# Patient Record
Sex: FEMALE | Race: WHITE | NOT HISPANIC OR LATINO | Employment: UNEMPLOYED | URBAN - NONMETROPOLITAN AREA
[De-identification: names, ages, dates, MRNs, and addresses within clinical notes are randomized per-mention and may not be internally consistent; named-entity substitution may affect disease eponyms.]

---

## 2020-05-08 ENCOUNTER — OFFICE VISIT (OUTPATIENT)
Dept: FAMILY MEDICINE CLINIC | Facility: CLINIC | Age: 20
End: 2020-05-08

## 2020-05-08 VITALS
WEIGHT: 152 LBS | OXYGEN SATURATION: 100 % | BODY MASS INDEX: 25.95 KG/M2 | SYSTOLIC BLOOD PRESSURE: 118 MMHG | HEIGHT: 64 IN | HEART RATE: 74 BPM | DIASTOLIC BLOOD PRESSURE: 68 MMHG | TEMPERATURE: 98.3 F

## 2020-05-08 DIAGNOSIS — K59.00 CONSTIPATION, UNSPECIFIED CONSTIPATION TYPE: Primary | ICD-10-CM

## 2020-05-08 DIAGNOSIS — R10.33 PERIUMBILICAL ABDOMINAL PAIN: ICD-10-CM

## 2020-05-08 PROCEDURE — 99213 OFFICE O/P EST LOW 20 MIN: CPT | Performed by: NURSE PRACTITIONER

## 2020-05-08 RX ORDER — POLYETHYLENE GLYCOL 3350 17 G/17G
17 POWDER, FOR SOLUTION ORAL DAILY
Qty: 10 EACH | Refills: 3 | Status: SHIPPED | OUTPATIENT
Start: 2020-05-08 | End: 2020-07-14

## 2020-05-08 NOTE — PROGRESS NOTES
Subjective   Khadijah Houston is a 19 y.o. female. Abdominal pain.    History of Present Illness   Abdominal Pain  Patient complains of abdominal pain. The pain is described as dull and stabbing, and is 4/10 in intensity. The patient is experiencing periumbilical pain without radiation. Onset was 2 days ago. Symptoms have been unchanged, intermittent . Aggravating factors: none.  Alleviating factors: none. Associated symptoms: diarrhea, harder stools, and abdominal bloating. The patient denies anorexia, arthralagias, belching, chills, constipation, dysuria, fever, flatus, frequency, headache, hematochezia, hematuria, melena, myalgias, nausea, sweats and vomiting. She admits to drinking very little water and says she eats more carbohydrates than fruits or vegetables.      The following portions of the patient's history were reviewed and updated as appropriate: allergies, current medications, past family history, past medical history, past social history, past surgical history and problem list.    Review of Systems   Constitutional: Negative for chills, fatigue and fever.   HENT: Negative for congestion, ear pain, rhinorrhea and sore throat.    Eyes: Negative for blurred vision, double vision and visual disturbance.   Respiratory: Negative for cough, chest tightness, shortness of breath and wheezing.    Cardiovascular: Negative for chest pain, palpitations and leg swelling.   Gastrointestinal: Positive for abdominal distention and abdominal pain. Negative for diarrhea, nausea and vomiting.   Endocrine: Negative for cold intolerance and heat intolerance.   Genitourinary: Negative for difficulty urinating, dysuria, frequency and hematuria.   Musculoskeletal: Negative for arthralgias, back pain, neck pain and neck stiffness.   Skin: Negative for dry skin, pallor, rash, skin lesions and bruise.   Allergic/Immunologic: Negative for environmental allergies, food allergies and immunocompromised state.   Neurological: Negative for  dizziness, syncope, weakness, light-headedness, headache and confusion.   Hematological: Negative for adenopathy. Does not bruise/bleed easily.   Psychiatric/Behavioral: Negative for self-injury, sleep disturbance, suicidal ideas, depressed mood and stress. The patient is not nervous/anxious.        Objective   Physical Exam   Constitutional: She is oriented to person, place, and time. She appears well-developed and well-nourished. She is cooperative. She does not have a sickly appearance. She does not appear ill. No distress.   HENT:   Head: Normocephalic.   Right Ear: Hearing, tympanic membrane, external ear and ear canal normal.   Left Ear: Hearing, tympanic membrane, external ear and ear canal normal.   Nose: Nose normal.   Mouth/Throat: Uvula is midline, oropharynx is clear and moist and mucous membranes are normal.   Eyes: Pupils are equal, round, and reactive to light. Conjunctivae, EOM and lids are normal.   Neck: Trachea normal, normal range of motion, full passive range of motion without pain and phonation normal. Neck supple. No thyroid mass and no thyromegaly present.   Cardiovascular: Normal rate, regular rhythm, S1 normal, S2 normal and normal heart sounds.   No murmur heard.  Pulmonary/Chest: Effort normal and breath sounds normal. No respiratory distress. She has no wheezes. She has no rhonchi. She has no rales.   Abdominal: Soft. Normal appearance and bowel sounds are normal. She exhibits distension. She exhibits no shifting dullness, no pulsatile liver, no fluid wave, no abdominal bruit, no ascites, no pulsatile midline mass and no mass. There is no tenderness. There is no rigidity, no rebound, no guarding, no CVA tenderness, no tenderness at McBurney's point and negative Ramirez's sign.   Neurological: She is alert and oriented to person, place, and time. She has normal strength. No cranial nerve deficit. Coordination and gait normal.   Skin: Skin is warm, dry and intact. She is not diaphoretic. No  pallor.   Psychiatric: She has a normal mood and affect. Her speech is normal and behavior is normal. Judgment and thought content normal. Cognition and memory are normal.     Vitals:    05/08/20 1424   BP: 118/68   Pulse: 74   Temp: 98.3 °F (36.8 °C)   SpO2: 100%         Assessment/Plan   Khadijah was seen today for abdominal pain.    Diagnoses and all orders for this visit:    Constipation, unspecified constipation type  -     magnesium hydroxide (MILK OF MAGNESIA) 400 MG/5ML suspension; Take 30-60 mL by mouth At Night As Needed for Constipation.  -     polyethylene glycol (MIRALAX) 17 g packet; Take 17 g by mouth Daily.    Periumbilical abdominal pain  -     XR Abdomen Flat & Upright (In Office)    Constipation- abdominal xray's show moderate amount of stool and gas, awaiting radiologist to confirm findings. Plan is to have her try MOM 30-60 ml nightly PRN and miralax 1 packet daily. Stressed importance of avoiding sodas and carbohydrates as this can cause/worsen constipation. Instructed pt to work on drinking 8-10 glasses of water daily and to stay active.  If symptoms do not improve or worsen, patient was instructed to return to clinic for further evaluation.         This document has been electronically signed by KISHA Carrillo on  May 8, 2020 15:07

## 2020-07-13 ENCOUNTER — OFFICE VISIT (OUTPATIENT)
Dept: FAMILY MEDICINE CLINIC | Facility: CLINIC | Age: 20
End: 2020-07-13

## 2020-07-13 VITALS
HEIGHT: 64 IN | OXYGEN SATURATION: 98 % | TEMPERATURE: 98.3 F | WEIGHT: 150.4 LBS | BODY MASS INDEX: 25.68 KG/M2 | SYSTOLIC BLOOD PRESSURE: 110 MMHG | HEART RATE: 114 BPM | DIASTOLIC BLOOD PRESSURE: 64 MMHG

## 2020-07-13 DIAGNOSIS — F41.9 ANXIETY: Primary | ICD-10-CM

## 2020-07-13 PROCEDURE — 99213 OFFICE O/P EST LOW 20 MIN: CPT | Performed by: NURSE PRACTITIONER

## 2020-07-13 RX ORDER — HYDROXYZINE PAMOATE 25 MG/1
25 CAPSULE ORAL 3 TIMES DAILY PRN
Qty: 90 CAPSULE | Refills: 0 | Status: SHIPPED | OUTPATIENT
Start: 2020-07-13 | End: 2021-12-08

## 2020-07-13 RX ORDER — SERTRALINE HYDROCHLORIDE 25 MG/1
25 TABLET, FILM COATED ORAL DAILY
Qty: 30 TABLET | Refills: 0 | Status: SHIPPED | OUTPATIENT
Start: 2020-07-13 | End: 2020-08-12 | Stop reason: SDUPTHER

## 2020-07-13 NOTE — PROGRESS NOTES
Subjective   Khadijah Houston is a 20 y.o. female. Anxiety     History of Present Illness   Anxiety  Patient is here for evaluation of anxiety.  She has the following anxiety symptoms: irritable, racing thoughts, shortness of breath. Onset of symptoms was approximately several weeks ago.  Symptoms have been gradually worsening since that time. She denies current suicidal and homicidal ideation. Family history significant for no psychiatric illness.Possible organic causes contributing are: none. Risk factors: none Previous treatment includes none.   She complains of the following medication side effects: none and never taken any medication. Pt reports anxiety is worse when she is working and is currently looking for new job. Reports mood swings and crying for no reason.     The following portions of the patient's history were reviewed and updated as appropriate: allergies, current medications, past family history, past medical history, past social history, past surgical history and problem list.    Review of Systems   Constitutional: Negative for chills, fatigue and fever.   HENT: Negative for congestion, ear pain, rhinorrhea and sore throat.    Eyes: Negative for blurred vision, double vision and visual disturbance.   Respiratory: Negative for cough, chest tightness, shortness of breath and wheezing.    Cardiovascular: Negative for chest pain, palpitations and leg swelling.   Gastrointestinal: Negative for abdominal pain, diarrhea, nausea and vomiting.   Endocrine: Negative for cold intolerance and heat intolerance.   Genitourinary: Negative for difficulty urinating, dysuria, frequency and hematuria.   Musculoskeletal: Negative for arthralgias, back pain, neck pain and neck stiffness.   Skin: Negative for dry skin, pallor, rash, skin lesions and bruise.   Allergic/Immunologic: Negative for environmental allergies, food allergies and immunocompromised state.   Neurological: Negative for dizziness, syncope, weakness,  light-headedness, headache and confusion.   Hematological: Negative for adenopathy. Does not bruise/bleed easily.   Psychiatric/Behavioral: Positive for stress (work related). Negative for self-injury, sleep disturbance, suicidal ideas and depressed mood. The patient is nervous/anxious.        Objective   Physical Exam   Constitutional: She is oriented to person, place, and time. She appears well-developed and well-nourished. She is cooperative. She does not have a sickly appearance. She does not appear ill. No distress.   HENT:   Head: Normocephalic.   Eyes: Pupils are equal, round, and reactive to light. Conjunctivae, EOM and lids are normal.   Neck: Normal range of motion and full passive range of motion without pain. Neck supple.   Cardiovascular: Normal rate, regular rhythm, S1 normal, S2 normal and normal heart sounds.   No murmur heard.  Pulmonary/Chest: Effort normal and breath sounds normal. No respiratory distress. She has no decreased breath sounds. She has no wheezes. She has no rhonchi. She has no rales.   Musculoskeletal: Normal range of motion.   Neurological: She is alert and oriented to person, place, and time. She has normal strength. Coordination and gait normal.   Skin: Skin is warm, dry and intact. She is not diaphoretic. No pallor.   Psychiatric: She has a normal mood and affect. Her speech is normal and behavior is normal. Judgment and thought content normal.     Vitals:    07/13/20 1528   BP: 110/64   Pulse: 114   Temp: 98.3 °F (36.8 °C)   SpO2: 98%         Assessment/Plan   Khadijah was seen today for anxiety.    Diagnoses and all orders for this visit:    Anxiety  -     sertraline (ZOLOFT) 25 MG tablet; Take 1 tablet by mouth Daily.  -     hydrOXYzine pamoate (Vistaril) 25 MG capsule; Take 1 capsule by mouth 3 (Three) Times a Day As Needed for Anxiety.    Anxiety- Pt's symptoms consistent with anxiety. Pt instructed to take zoloft 25 mg daily, she understands it will take 4-6 weeks for it to  be therapeutic. In the meantime she can take vistaril 25 mg TID PRN for anxiety, warned of drowsiness. Pt understands zoloft can cause suicidal thoughts or actions and understands to seek immediate medical attention if she experiences any suicidal thoughts or actions. Pt denies any current suicidal thoughts or actions.   No follow-ups on file.  If symptoms do not improve or worsen, patient was instructed to return to clinic for further evaluation.         This document has been electronically signed by KISHA Carrillo on  July 14, 2020 10:05

## 2020-08-05 ENCOUNTER — OFFICE VISIT (OUTPATIENT)
Dept: FAMILY MEDICINE CLINIC | Facility: CLINIC | Age: 20
End: 2020-08-05

## 2020-08-05 VITALS
WEIGHT: 149 LBS | DIASTOLIC BLOOD PRESSURE: 70 MMHG | SYSTOLIC BLOOD PRESSURE: 122 MMHG | HEART RATE: 68 BPM | BODY MASS INDEX: 25.44 KG/M2 | TEMPERATURE: 99.4 F | OXYGEN SATURATION: 100 % | HEIGHT: 64 IN

## 2020-08-05 DIAGNOSIS — M25.511 ACUTE PAIN OF RIGHT SHOULDER: Primary | ICD-10-CM

## 2020-08-05 DIAGNOSIS — M62.838 MUSCLE SPASMS OF NECK: ICD-10-CM

## 2020-08-05 PROCEDURE — 96372 THER/PROPH/DIAG INJ SC/IM: CPT | Performed by: NURSE PRACTITIONER

## 2020-08-05 PROCEDURE — 99213 OFFICE O/P EST LOW 20 MIN: CPT | Performed by: NURSE PRACTITIONER

## 2020-08-05 RX ORDER — TRIAMCINOLONE ACETONIDE 40 MG/ML
80 INJECTION, SUSPENSION INTRA-ARTICULAR; INTRAMUSCULAR ONCE
Status: COMPLETED | OUTPATIENT
Start: 2020-08-05 | End: 2020-08-05

## 2020-08-05 RX ORDER — TIZANIDINE HYDROCHLORIDE 4 MG/1
4 CAPSULE, GELATIN COATED ORAL 3 TIMES DAILY PRN
Qty: 60 CAPSULE | Refills: 0 | Status: SHIPPED | OUTPATIENT
Start: 2020-08-05 | End: 2021-12-08

## 2020-08-05 RX ADMIN — TRIAMCINOLONE ACETONIDE 80 MG: 40 INJECTION, SUSPENSION INTRA-ARTICULAR; INTRAMUSCULAR at 15:42

## 2020-08-05 NOTE — PROGRESS NOTES
Subjective   Khadijah Houston is a 20 y.o. female. Shoulder pain    History of Present Illness   Shoulder Pain  Patient complains of right shoulder pain. The symptoms began 1 month ago. Aggravating factors: repetitive activity, reaching above head stocking shelves. Pain is located between the neck and shoulder. Discomfort is described as aching. Symptoms are exacerbated by repetitive movements and overhead movements. Evaluation to date: chiropractor. Therapy to date includes: rest, OTC analgesics which are somewhat effective and massage therapy.    The following portions of the patient's history were reviewed and updated as appropriate: allergies, current medications, past family history, past medical history, past social history, past surgical history and problem list.    Review of Systems   Constitutional: Negative for chills, fatigue and fever.   HENT: Negative for congestion, ear pain, rhinorrhea and sore throat.    Eyes: Negative for blurred vision, double vision and visual disturbance.   Respiratory: Negative for cough, chest tightness, shortness of breath and wheezing.    Cardiovascular: Negative for chest pain, palpitations and leg swelling.   Gastrointestinal: Negative for abdominal pain, diarrhea, nausea and vomiting.   Endocrine: Negative for cold intolerance and heat intolerance.   Genitourinary: Negative for difficulty urinating, dysuria, frequency and hematuria.   Musculoskeletal: Positive for arthralgias (right shoulder) and neck pain (right side). Negative for back pain and neck stiffness.   Skin: Negative for dry skin, pallor, rash, skin lesions and bruise.   Allergic/Immunologic: Negative for environmental allergies, food allergies and immunocompromised state.   Neurological: Negative for dizziness, syncope, weakness, light-headedness, headache and confusion.   Hematological: Negative for adenopathy. Does not bruise/bleed easily.   Psychiatric/Behavioral: Negative for self-injury, sleep disturbance,  suicidal ideas, depressed mood and stress. The patient is not nervous/anxious.        Objective   Physical Exam   Constitutional: She is oriented to person, place, and time. She appears well-developed and well-nourished. She is cooperative. She does not have a sickly appearance. She does not appear ill. No distress.   HENT:   Head: Normocephalic.   Eyes: Pupils are equal, round, and reactive to light. Conjunctivae, EOM and lids are normal.   Neck: Normal range of motion and full passive range of motion without pain. Neck supple.   Musculoskeletal: Normal range of motion.        Right shoulder: She exhibits pain. She exhibits normal range of motion, no tenderness, no bony tenderness, no swelling, no effusion, no crepitus, no deformity, no laceration, no spasm, normal pulse and normal strength.        Cervical back: She exhibits pain and spasm. She exhibits normal range of motion, no tenderness, no bony tenderness, no swelling, no edema, no deformity, no laceration and normal pulse.   Neurological: She is alert and oriented to person, place, and time. She has normal strength. Coordination and gait normal.   Skin: Skin is warm, dry and intact. She is not diaphoretic. No pallor.   Psychiatric: She has a normal mood and affect. Her speech is normal and behavior is normal. Judgment and thought content normal.     Vitals:    08/05/20 1504   BP: 122/70   Pulse: 68   Temp: 99.4 °F (37.4 °C)   SpO2: 100%         Assessment/Plan   Khadijah was seen today for shoulder pain.    Diagnoses and all orders for this visit:    Acute pain of right shoulder  -     XR Shoulder 2+ View Right (In Office)  -     triamcinolone acetonide (KENALOG-40) injection 80 mg    Muscle spasms of neck  -     TiZANidine (ZANAFLEX) 4 MG capsule; Take 1 capsule by mouth 3 (Three) Times a Day As Needed for Muscle Spasms.    Acute pain of right shoulder- Shoulder I believe is inflamed due to repetitive motions she does at work. Kenalog 80 mg given IM to reduce  inflammation/pain. Stressed importance of rest, stretching before activity, ice, and can take ibuprofen 600 mg every 8 hours PRN for pain.  Muscle spasms of neck- Pt is straining neck to compensate for shoulder pain. Recommended stretching, heat, kenalog should help neck also, pt instructed to take zanaflex 4 mg TID PRN for muscle spasms (warned of drowsiness).   If symptoms do not improve or worsen, patient was instructed to return to clinic for further evaluation.         This document has been electronically signed by KISHA Carrillo on  August 6, 2020 08:06

## 2020-08-11 ENCOUNTER — TELEPHONE (OUTPATIENT)
Dept: FAMILY MEDICINE CLINIC | Facility: CLINIC | Age: 20
End: 2020-08-11

## 2020-08-12 DIAGNOSIS — F41.9 ANXIETY: ICD-10-CM

## 2020-08-12 RX ORDER — SERTRALINE HYDROCHLORIDE 25 MG/1
25 TABLET, FILM COATED ORAL DAILY
Qty: 90 TABLET | Refills: 3 | Status: SHIPPED | OUTPATIENT
Start: 2020-08-12 | End: 2021-12-08

## 2021-12-08 ENCOUNTER — OFFICE VISIT (OUTPATIENT)
Dept: FAMILY MEDICINE CLINIC | Facility: CLINIC | Age: 21
End: 2021-12-08

## 2021-12-08 VITALS
BODY MASS INDEX: 23.08 KG/M2 | DIASTOLIC BLOOD PRESSURE: 70 MMHG | TEMPERATURE: 97.8 F | SYSTOLIC BLOOD PRESSURE: 110 MMHG | HEART RATE: 85 BPM | WEIGHT: 135.2 LBS | HEIGHT: 64 IN | OXYGEN SATURATION: 98 %

## 2021-12-08 DIAGNOSIS — M54.2 NECK PAIN: ICD-10-CM

## 2021-12-08 DIAGNOSIS — M25.511 ACUTE PAIN OF RIGHT SHOULDER: Primary | ICD-10-CM

## 2021-12-08 PROCEDURE — 99213 OFFICE O/P EST LOW 20 MIN: CPT | Performed by: NURSE PRACTITIONER

## 2021-12-08 PROCEDURE — 96372 THER/PROPH/DIAG INJ SC/IM: CPT | Performed by: NURSE PRACTITIONER

## 2021-12-08 RX ORDER — TRIAMCINOLONE ACETONIDE 40 MG/ML
80 INJECTION, SUSPENSION INTRA-ARTICULAR; INTRAMUSCULAR ONCE
Status: COMPLETED | OUTPATIENT
Start: 2021-12-08 | End: 2021-12-08

## 2021-12-08 RX ADMIN — TRIAMCINOLONE ACETONIDE 80 MG: 40 INJECTION, SUSPENSION INTRA-ARTICULAR; INTRAMUSCULAR at 14:13

## 2021-12-08 NOTE — PROGRESS NOTES
Injection  Injection performed in  by Asha Houston MA. Patient tolerated the procedure well without complications.  12/08/21   Asha Houston MA

## 2021-12-08 NOTE — PROGRESS NOTES
Subjective   Khadijah Houston is a 21 y.o. female. Shoulder pain (right)    History of Present Illness   Patient presents today for shoulder pain. She says shoulder pain started around 1.5 weeks ago after she went bowling. She is also experiencing pain in her neck. Pt reports similar pain in the past and says a steroid shot helped. Pt denies any loss of ROM or strength. At home she has been taking muscle relaxer and advil. Advil provides some pain relief.    The following portions of the patient's history were reviewed and updated as appropriate: allergies, current medications, past family history, past medical history, past social history, past surgical history, and problem list.    Review of Systems   Constitutional: Negative for chills, fatigue and fever.   HENT: Negative for congestion, ear pain, rhinorrhea and sore throat.    Eyes: Negative for blurred vision, double vision and visual disturbance.   Respiratory: Negative for cough, chest tightness, shortness of breath and wheezing.    Cardiovascular: Negative for chest pain, palpitations and leg swelling.   Gastrointestinal: Negative for abdominal pain, diarrhea, nausea and vomiting.   Endocrine: Negative for cold intolerance and heat intolerance.   Genitourinary: Negative for difficulty urinating, dysuria, frequency and hematuria.   Musculoskeletal: Positive for arthralgias (shoulder pain). Negative for back pain, neck pain and neck stiffness.   Skin: Negative for dry skin, pallor, rash, skin lesions and bruise.   Allergic/Immunologic: Negative for environmental allergies, food allergies and immunocompromised state.   Neurological: Negative for dizziness, syncope, weakness, light-headedness, headache and confusion.   Hematological: Negative for adenopathy. Does not bruise/bleed easily.   Psychiatric/Behavioral: Negative for self-injury, sleep disturbance, suicidal ideas, depressed mood and stress. The patient is not nervous/anxious.        Vitals:    12/08/21 1329    BP: 110/70   Pulse: 85   Temp: 97.8 °F (36.6 °C)   SpO2: 98%     Body mass index is 23.21 kg/m².    Objective   Physical Exam  Vitals and nursing note reviewed.   Constitutional:       Appearance: She is well-developed.   HENT:      Head: Normocephalic.   Eyes:      Conjunctiva/sclera: Conjunctivae normal.   Musculoskeletal:         General: Normal range of motion.      Right shoulder: Normal.      Cervical back: Normal, full passive range of motion without pain, normal range of motion and neck supple.   Skin:     General: Skin is warm and dry.   Neurological:      Mental Status: She is alert and oriented to person, place, and time.      Coordination: Coordination normal.      Gait: Gait normal.   Psychiatric:         Speech: Speech normal.         Behavior: Behavior normal. Behavior is cooperative.         Thought Content: Thought content normal.         Judgment: Judgment normal.           Assessment/Plan   Diagnoses and all orders for this visit:    1. Acute pain of right shoulder (Primary)    2. Neck pain  -     triamcinolone acetonide (KENALOG-40) injection 80 mg    Symptoms consistent with muscle strain of neck. Offered xray's, pt would like to treat and if symptoms do not improve we will do xray's.   Kenalog 80 mg given IM in office to reduce inflammation which should help with pain relief.  Recommended rest, stretching, heat, advil otc as directed PRN for pain.  If symptoms do not improve or worsen, patient was instructed to return to clinic for further evaluation.         This document has been electronically signed by KISHA Carrillo on  December 8, 2021 14:30 CST

## 2022-08-04 ENCOUNTER — TELEPHONE (OUTPATIENT)
Dept: INTERNAL MEDICINE | Facility: CLINIC | Age: 22
End: 2022-08-04

## 2022-08-04 NOTE — TELEPHONE ENCOUNTER
Patient called clinic and stated she thinks she had a heart attack 2 nights ago. Pt states she is still having chest pain and is having some shortness of breath issues. Patient states that she is still feeling very weak. I directed pt to the ER to be evaluated. Patient is going to call us back to make an apt after ER visit and will keep us updated.

## 2022-08-05 ENCOUNTER — TELEPHONE (OUTPATIENT)
Dept: INTERNAL MEDICINE | Facility: CLINIC | Age: 22
End: 2022-08-05

## 2022-08-10 ENCOUNTER — OFFICE VISIT (OUTPATIENT)
Dept: CARDIOLOGY | Facility: CLINIC | Age: 22
End: 2022-08-10

## 2022-08-10 VITALS
HEART RATE: 72 BPM | BODY MASS INDEX: 22.36 KG/M2 | SYSTOLIC BLOOD PRESSURE: 110 MMHG | WEIGHT: 131 LBS | OXYGEN SATURATION: 97 % | DIASTOLIC BLOOD PRESSURE: 80 MMHG | HEIGHT: 64 IN

## 2022-08-10 DIAGNOSIS — R06.09 DOE (DYSPNEA ON EXERTION): ICD-10-CM

## 2022-08-10 DIAGNOSIS — R77.8 ELEVATED TROPONIN: ICD-10-CM

## 2022-08-10 DIAGNOSIS — I30.9 ACUTE PERICARDITIS, UNSPECIFIED TYPE: ICD-10-CM

## 2022-08-10 DIAGNOSIS — R07.9 CHEST PAIN IN ADULT: Primary | ICD-10-CM

## 2022-08-10 PROBLEM — R79.89 ELEVATED TROPONIN: Status: ACTIVE | Noted: 2022-08-10

## 2022-08-10 PROCEDURE — 99204 OFFICE O/P NEW MOD 45 MIN: CPT | Performed by: INTERNAL MEDICINE

## 2022-08-10 RX ORDER — COLCHICINE 0.6 MG/1
0.6 TABLET ORAL DAILY
Qty: 28 TABLET | Refills: 3 | Status: SHIPPED | OUTPATIENT
Start: 2022-08-10 | End: 2023-02-21

## 2022-08-10 NOTE — PROGRESS NOTES
Khadijah Houston  [id number]  2000  22 y.o.  female    Referring Provider: Provider, No Known    Reason for  Visit:  Initial visit       Subjective     Chest pain both with exertion as well as at rest.  Feels episodes of chest pain occur more often with exertion  Sharp substernal,   Pressure like at times     Chest pain non pleuritic  Lying backwards chest pain worsens   Chest pain non positional and non gustatory   Lasted for several hours  Radiated to her jaw     Started 1 week ago  Occurs once or twice a week on the average but can be variable in frequency  No associated diaphoresis    No associated nausea  No radiation    Relieved with rest or spontaneously  Not positional    No change with intake of food or antacids  No change with breathing  Mild to moderate associated dyspnea    No similar chest pain episodes in the past     CT angiogram coronary arteries heart with contrast    Anatomical Region Laterality Modality   Chest -- Computed Tomography       Impression    1.  Normal appearance and course of the coronary arteries, negative for plaque or stenosis. CAD RADS 0. Negative for coronary anomaly, pseudoaneurysm or dissection.   2.  Normal appearance of the thoracic aorta. Lungs clear negative for pneumonia.   3.  See body of report for additional details.     I, Azam Quiroz MD, have reviewed the images and verify the above interpretation on 8/5/2022 12:42 PM.     Electronically signed by  Azam Quiroz MD on 8/5/2022 12:42 PM               [place]  Outside Information         Contains abnormal data CRP  Specimen:  Blood  Component   Ref Range & Units 5 d ago   C-Reactive Protein   0.0 - 5.0 mg/L 10.1 High     Resulting Agency [facility]   Specimen Collected: 08/05/22 10:51 AM Last Resulted: 08/05/22 12:22 PM   Received From: [place]  Result Received: 08/10/22 11:31 AM              History of present illness:  Khadijah Houston is a 22 y.o. yo  "female with no major medical issues  who presents today for   Chief Complaint   Patient presents with   • Establish Care     Recent ER f/u - Likely    .    History  History reviewed. No pertinent past medical history.,   History reviewed. No pertinent surgical history.,   Family History   Problem Relation Age of Onset   • No Known Problems Mother    • No Known Problems Father    • Heart disease Maternal Grandfather    • Heart disease Paternal Grandmother    ,   Social History     Tobacco Use   • Smoking status: Never Smoker   • Smokeless tobacco: Never Used   Substance Use Topics   • Alcohol use: Not Currently   • Drug use: Yes     Types: Marijuana   ,     Medications  Current Outpatient Medications   Medication Sig Dispense Refill   • colchicine 0.6 MG tablet Take 1 tablet by mouth Daily. 28 tablet 3     No current facility-administered medications for this visit.       Allergies:  Patient has no known allergies.    Review of Systems  Review of Systems   Constitutional: Negative.   HENT: Negative.    Eyes: Negative.    Cardiovascular: Positive for chest pain and dyspnea on exertion. Negative for claudication, cyanosis, irregular heartbeat, leg swelling, near-syncope, orthopnea, palpitations, paroxysmal nocturnal dyspnea and syncope.   Respiratory: Negative.    Endocrine: Negative.    Hematologic/Lymphatic: Negative.    Skin: Negative.    Gastrointestinal: Negative for anorexia.   Genitourinary: Negative.    Neurological: Negative.    Psychiatric/Behavioral: Negative.        Objective     Physical Exam:  /80   Pulse 72   Ht 162.6 cm (64\")   Wt 59.4 kg (131 lb)   SpO2 97%   BMI 22.49 kg/m²     Physical Exam  Constitutional:       Appearance: Normal appearance.   HENT:      Head: Normocephalic.   Eyes:      General: Lids are normal.   Neck:      Vascular: No carotid bruit.   Cardiovascular:      Rate and Rhythm: Regular rhythm.      Heart sounds: Normal heart sounds, S1 normal and S2 normal.    No " systolic murmur is present.  Pulmonary:      Effort: Pulmonary effort is normal.   Abdominal:      Palpations: Abdomen is soft.   Neurological:      Mental Status: She is alert.   Psychiatric:         Speech: Speech normal.         Behavior: Behavior normal.         Thought Content: Thought content normal.         Results Review:     ____________________________________________________________________________________________________________________________________________  Health maintenance and recommendations    Low salt/ HTN/ Heart healthy carbohydrate restricted cardiac diet   The patient is advised to reduce or avoid caffeine or other cardiac stimulants.   Minimize or avoid  NSAID-type medications      Monitor for any signs of bleeding including red or dark stools. Fall precautions.   Advised staying uptodate with immunizations per established standard guidelines.    Offered to give patient  a copy of my notes     Questions were encouraged, asked and answered to the patient's  understanding and satisfaction. Questions if any regarding current medications and side effects, need for refills and importance of compliance to medications stressed.    Reviewed available prior notes, consults, prior visits, laboratory findings, radiology and cardiology relevant reports. Updated chart as applicable. I have reviewed the patient's medical history in detail and updated the computerized patient record as relevant.      Updated patient regarding any new or relevant abnormalities on review of records or any new findings on physical exam. Mentioned to patient about purpose of visit and desirable health short and long term goals and objectives.    Primary to monitor CBC CMP Lipid panel and TSH as applicable    ___________________________________________________________________________________________________________________________________________   Procedures    Assessment & Plan   Diagnoses and all orders for this  visit:    1. Chest pain in adult (Primary)    2. PERALTA (dyspnea on exertion)    3. Acute pericarditis, unspecified type    4. Elevated troponin    Other orders  -     colchicine 0.6 MG tablet; Take 1 tablet by mouth Daily.  Dispense: 28 tablet; Refill: 3          Plan    Patient expressed understanding  Encouraged and answered all questions   Discussed with the patient and all questioned fully answered. She will call me if any problems arise.   Discussed results of prior testing with patient : echo and cardiac CTA     Motrin 400 mg TID after meals   Requested Prescriptions     Signed Prescriptions Disp Refills   • colchicine 0.6 MG tablet 28 tablet 3     Sig: Take 1 tablet by mouth Daily.      Check BP and heart rates twice daily initially till blood pressures and heart rates under good control and then at least 3x / week,   If blood pressures continue to be well-controlled then can check week a month  at home and bring a recording for review next visit  If BP >130/85 or < 100/60 persistently over 3 reading 30 mins apart or if heart rates persistently above 100 bpm or less than 55 bpm call sooner for evaluation and advise     Follow up with Minerva BEARD , Connor BEARD or Peg Vaughn PA-C             Return in about 2 weeks (around 8/24/2022).

## 2022-09-02 ENCOUNTER — OFFICE VISIT (OUTPATIENT)
Dept: CARDIOLOGY | Facility: CLINIC | Age: 22
End: 2022-09-02

## 2022-09-02 VITALS
BODY MASS INDEX: 22.2 KG/M2 | SYSTOLIC BLOOD PRESSURE: 116 MMHG | OXYGEN SATURATION: 98 % | WEIGHT: 130 LBS | DIASTOLIC BLOOD PRESSURE: 76 MMHG | HEIGHT: 64 IN | HEART RATE: 63 BPM

## 2022-09-02 DIAGNOSIS — R06.09 DOE (DYSPNEA ON EXERTION): ICD-10-CM

## 2022-09-02 DIAGNOSIS — I30.9 ACUTE PERICARDITIS, UNSPECIFIED TYPE: ICD-10-CM

## 2022-09-02 DIAGNOSIS — R77.8 ELEVATED TROPONIN: Primary | ICD-10-CM

## 2022-09-02 DIAGNOSIS — R07.9 CHEST PAIN IN ADULT: ICD-10-CM

## 2022-09-02 PROCEDURE — 93000 ELECTROCARDIOGRAM COMPLETE: CPT | Performed by: INTERNAL MEDICINE

## 2022-09-02 PROCEDURE — 99214 OFFICE O/P EST MOD 30 MIN: CPT | Performed by: INTERNAL MEDICINE

## 2022-09-02 NOTE — PROGRESS NOTES
Khadijah Houston  [id number]  2000  22 y.o.  female    Referring Provider: Provider, No Known    Reason for  Visit:      Here for routine follow up   Initial visit  Was for chest pain     Subjective       Overall much better   No new events or complaints since last visit     Colchicine almost resolved her chest pain   If she misses a dose the chest pain return  Minimal diarrhea   Chest pain both with exertion as well as at rest.  Feels episodes of chest pain occur more often with exertion  Sharp substernal,   Pressure like at times     Chest pain non pleuritic  Lying backwards chest pain worsens   Chest pain non positional and non gustatory   Lasted for several hours  Radiated to her jaw     Started 1 week ago  Occurs once or twice a week on the average but can be variable in frequency  No associated diaphoresis    No associated nausea  No radiation    Relieved with rest or spontaneously  Not positional    No change with intake of food or antacids  No change with breathing  Mild to moderate associated dyspnea    No similar chest pain episodes in the past     CT angiogram coronary arteries heart with contrast    Anatomical Region Laterality Modality   Chest -- Computed Tomography       Impression    1.  Normal appearance and course of the coronary arteries, negative for plaque or stenosis. CAD RADS 0. Negative for coronary anomaly, pseudoaneurysm or dissection.   2.  Normal appearance of the thoracic aorta. Lungs clear negative for pneumonia.   3.  See body of report for additional details.     I, Azam Quiroz MD, have reviewed the images and verify the above interpretation on 8/5/2022 12:42 PM.     Electronically signed by  Azam Quiroz MD on 8/5/2022 12:42 PM               [facility]  Outside Information         Contains abnormal data CRP  Specimen:  Blood  Component   Ref Range & Units 5 d ago   C-Reactive Protein   0.0 - 5.0 mg/L 10.1 High     Resulting Agency [facility]  "  Specimen Collected: 08/05/22 10:51 AM Last Resulted: 08/05/22 12:22 PM   Received From: [facility]  Result Received: 08/10/22 11:31 AM              History of present illness:  Khadijah Houston is a 22 y.o. yo female with no major medical issues  who presents today for   Chief Complaint   Patient presents with   • Chest Pain     2 wk fu for chest pain   .    History  History reviewed. No pertinent past medical history.,   History reviewed. No pertinent surgical history.,   Family History   Problem Relation Age of Onset   • No Known Problems Mother    • No Known Problems Father    • Heart disease Maternal Grandfather    • Heart disease Paternal Grandmother    ,   Social History     Tobacco Use   • Smoking status: Never Smoker   • Smokeless tobacco: Never Used   Vaping Use   • Vaping Use: Never used   Substance Use Topics   • Alcohol use: Not Currently   • Drug use: Yes     Types: Marijuana   ,     Medications  Current Outpatient Medications   Medication Sig Dispense Refill   • colchicine 0.6 MG tablet Take 1 tablet by mouth Daily. 28 tablet 3     No current facility-administered medications for this visit.       Allergies:  Patient has no known allergies.    Review of Systems  Review of Systems   Constitutional: Negative.   HENT: Negative.    Eyes: Negative.    Cardiovascular: Positive for chest pain and dyspnea on exertion. Negative for claudication, cyanosis, irregular heartbeat, leg swelling, near-syncope, orthopnea, palpitations, paroxysmal nocturnal dyspnea and syncope.   Respiratory: Negative.    Endocrine: Negative.    Hematologic/Lymphatic: Negative.    Skin: Negative.    Gastrointestinal: Negative for anorexia.   Genitourinary: Negative.    Neurological: Negative.    Psychiatric/Behavioral: Negative.        Objective     Physical Exam:  /76 (BP Location: Right arm, Patient Position: Sitting, Cuff Size: Adult)   Pulse 63   Ht 162.6 cm (64.02\")   Wt 59 kg (130 lb)   SpO2 98%   " BMI 22.30 kg/m²     Physical Exam  Constitutional:       Appearance: Normal appearance.   HENT:      Head: Normocephalic.   Eyes:      General: Lids are normal.   Neck:      Vascular: No carotid bruit.   Cardiovascular:      Rate and Rhythm: Regular rhythm.      Heart sounds: Normal heart sounds, S1 normal and S2 normal.    No systolic murmur is present.  Pulmonary:      Effort: Pulmonary effort is normal.   Abdominal:      Palpations: Abdomen is soft.   Neurological:      Mental Status: She is alert.   Psychiatric:         Speech: Speech normal.         Behavior: Behavior normal.         Thought Content: Thought content normal.         Results Review:    Narrative    This result has an attachment that is not available.   •  Left Ventricle: The left ventricle is normal in size. (LVIDd 4.12 cm)   Left ventricular ejection fraction is normal.  Left ventricular diastolic   function appears normal.   •  Right Ventricle: Right ventricle is mildly dilated. RV systolic   function is normal. Unable to calculate RVSP.   •  No significant valve dysfunction.   •  No prior echo for comparison.     Left Ventricle   The left ventricle is normal in size. (LVIDd 4.12 cm) Left ventricular wall thickness is normal. Left ventricular ejection fraction is normal. The visually estimated ejection fraction is 55-60%. Left ventricular wall motion is normal. Left ventricular diastolic function appears normal.     Right Ventricle   Right ventricle is mildly dilated. RV systolic function is normal.     Left Atrium   Left atrium size is normal.     Right Atrium   Right atrium is normal.     IVC/SVC   The inferior vena cava demonstrates a diameter of <=21 mm and collapses >50%; therefore, the right atrial pressure is estimated at 0-5 mmHg.     Mitral Valve   Mitral valve structure is normal. There is trace regurgitation. There is no evidence of mitral valve stenosis.     Tricuspid Valve   Tricuspid valve structure is normal. There is trace  regurgitation.     Aortic Valve   The aortic valve is trileaflet. There is no regurgitation or stenosis.     Pulmonic Valve   Pulmonic valve structure is normal. There is trace regurgitation.     Pericardium   There is no pericardial effusion.     Study Details   Overall the study quality was good.         Exam End: 08/05/22 10:38 Last Resulted: 08/05/22 12:54   Received From: [facility]  Result Received: 08/08/22 16:22          ____________________________________________________________________________________________________________________________________________  Health maintenance and recommendations    Low salt/ HTN/ Heart healthy carbohydrate restricted cardiac diet   The patient is advised to reduce or avoid caffeine or other cardiac stimulants.   Minimize or avoid  NSAID-type medications      Monitor for any signs of bleeding including red or dark stools. Fall precautions.   Advised staying uptodate with immunizations per established standard guidelines.    Offered to give patient  a copy of my notes     Questions were encouraged, asked and answered to the patient's  understanding and satisfaction. Questions if any regarding current medications and side effects, need for refills and importance of compliance to medications stressed.    Reviewed available prior notes, consults, prior visits, laboratory findings, radiology and cardiology relevant reports. Updated chart as applicable. I have reviewed the patient's medical history in detail and updated the computerized patient record as relevant.      Updated patient regarding any new or relevant abnormalities on review of records or any new findings on physical exam. Mentioned to patient about purpose of visit and desirable health short and long term goals and objectives.    Primary to monitor CBC CMP Lipid panel and TSH as  applicable    ___________________________________________________________________________________________________________________________________________     ECG 12 Lead    Date/Time: 9/2/2022 8:24 AM  Performed by: Kan Jeffers MD  Authorized by: Kan Jeffers MD   Comparison: not compared with previous ECG   Rhythm: sinus rhythm  Rate: normal  Conduction: conduction normal  ST Segments: ST segments normal  T Waves: T waves normal  QRS axis: normal  Other: no other findings    Clinical impression: abnormal EKG  Comments: Short [place] interval             Assessment & Plan   Diagnoses and all orders for this visit:    1. Elevated troponin (Primary)    2. PERALTA (dyspnea on exertion)    3. Chest pain in adult    4. Acute pericarditis, unspecified type    Other orders  -     ECG 12 Lead          Plan      Overall doing well no new cardiovascular symptoms and therefore no additional cardiac testing is required prior to next visit  If any interim issues arise will call me for further evaluation.     Patient expressed understanding  Encouraged and answered all questions   Discussed with the patient and all questioned fully answered. She will call me if any problems arise.   Discussed results of prior testing with patient : echo and cardiac CTA     Motrin 400 mg TID after meals PRN   Take Colchicine daily      Check BP and heart rates twice daily initially till blood pressures and heart rates under good control and then at least 3x / week,   If blood pressures continue to be well-controlled then can check week a month  at home and bring a recording for review next visit  If BP >130/85 or < 100/60 persistently over 3 reading 30 mins apart or if heart rates persistently above 100 bpm or less than 55 bpm call sooner for evaluation and advise     Follow up with Minerva Burton APRN            Return in about 3 months (around 12/2/2022).

## 2023-02-21 ENCOUNTER — OFFICE VISIT (OUTPATIENT)
Dept: FAMILY MEDICINE CLINIC | Facility: CLINIC | Age: 23
End: 2023-02-21

## 2023-02-21 VITALS
HEART RATE: 84 BPM | OXYGEN SATURATION: 99 % | SYSTOLIC BLOOD PRESSURE: 116 MMHG | TEMPERATURE: 98.4 F | BODY MASS INDEX: 23.99 KG/M2 | DIASTOLIC BLOOD PRESSURE: 74 MMHG | WEIGHT: 140.5 LBS | HEIGHT: 64 IN

## 2023-02-21 DIAGNOSIS — N64.4 BREAST TENDERNESS: Primary | ICD-10-CM

## 2023-02-21 DIAGNOSIS — R21 RASH: ICD-10-CM

## 2023-02-21 PROCEDURE — 99212 OFFICE O/P EST SF 10 MIN: CPT | Performed by: NURSE PRACTITIONER

## 2023-02-21 NOTE — PROGRESS NOTES
Subjective   Khadijah Houston is a 22 y.o. female. Breast problem    History of Present Illness   Patient presents today for breast problem. She says she noticed lump in right breast 1 month ago. Over lump she says she has rash. Rash initially was red in color and now appears purple in color. She says lump felt hard but now feels soft. Pt denies any nipple discharge/beeding or lymphadenopathy.     The following portions of the patient's history were reviewed and updated as appropriate: allergies, current medications, past family history, past medical history, past social history, past surgical history, and problem list.    Review of Systems   Constitutional: Negative for chills, fatigue and fever.   HENT: Negative for congestion, ear pain, rhinorrhea and sore throat.    Eyes: Negative for blurred vision, double vision and visual disturbance.   Respiratory: Negative for cough, chest tightness, shortness of breath and wheezing.    Cardiovascular: Negative for chest pain, palpitations and leg swelling.   Gastrointestinal: Negative for abdominal pain, diarrhea, nausea and vomiting.   Endocrine: Negative for cold intolerance and heat intolerance.   Genitourinary: Positive for breast lump and breast pain. Negative for difficulty urinating, dysuria, frequency and hematuria.   Musculoskeletal: Negative for arthralgias, back pain, neck pain and neck stiffness.   Skin: Positive for rash. Negative for dry skin, pallor, skin lesions and wound.   Allergic/Immunologic: Negative for environmental allergies, food allergies and immunocompromised state.   Neurological: Negative for dizziness, syncope, weakness, light-headedness, headache and confusion.   Hematological: Negative for adenopathy. Does not bruise/bleed easily.   Psychiatric/Behavioral: Negative for self-injury, sleep disturbance, suicidal ideas, depressed mood and stress. The patient is not nervous/anxious.        Vitals:    02/21/23 1127   BP: 116/74   Pulse: 84   Temp: 98.4  °F (36.9 °C)   SpO2: 99%     Body mass index is 24.1 kg/m².    Objective   Physical Exam  Exam conducted with a chaperone present.   Constitutional:       General: She is not in acute distress.     Appearance: She is well-developed. She is not ill-appearing or diaphoretic.   HENT:      Head: Normocephalic.   Eyes:      General: Lids are normal.      Conjunctiva/sclera: Conjunctivae normal.      Pupils: Pupils are equal, round, and reactive to light.   Chest:      Chest wall: No mass, lacerations, deformity, swelling, tenderness, crepitus or edema. There is no dullness to percussion.   Breasts:     Breasts are symmetrical.      Right: Skin change and tenderness present. No swelling, bleeding, inverted nipple, mass or nipple discharge.      Left: Normal.       Musculoskeletal:         General: Normal range of motion.      Cervical back: Full passive range of motion without pain, normal range of motion and neck supple.   Lymphadenopathy:      Upper Body:      Right upper body: No supraclavicular, axillary or pectoral adenopathy.      Left upper body: No supraclavicular, axillary or pectoral adenopathy.   Skin:     General: Skin is warm and dry.      Coloration: Skin is not pale.   Neurological:      Mental Status: She is alert and oriented to person, place, and time.      Coordination: Coordination normal.      Gait: Gait normal.   Psychiatric:         Speech: Speech normal.         Behavior: Behavior normal. Behavior is cooperative.         Thought Content: Thought content normal.         Judgment: Judgment normal.           Assessment & Plan   Diagnoses and all orders for this visit:    1. Breast tenderness (Primary)  -     US breast right limited    2. Rash  -     US breast right limited      Breast exam revealed right breast tenderness with scaly rash around 12 o'clock 1 inch above nipple. No mass noted on exam but breast tissue was very dense.  U/S ordered to further evaluate.  Will call pt with results.    If  symptoms do not improve or worsen, patient was instructed to return to clinic for further evaluation.         This document has been electronically signed by KISHA Carrillo on  February 21, 2023 13:33 CST

## 2023-03-16 ENCOUNTER — HOSPITAL ENCOUNTER (OUTPATIENT)
Dept: ULTRASOUND IMAGING | Facility: HOSPITAL | Age: 23
Discharge: HOME | End: 2023-03-16
Admitting: NURSE PRACTITIONER
Payer: COMMERCIAL

## 2023-03-16 DIAGNOSIS — L02.91 ABSCESS: Primary | ICD-10-CM

## 2023-03-16 PROCEDURE — 76642 ULTRASOUND BREAST LIMITED: CPT

## 2023-03-16 RX ORDER — FLUCONAZOLE 150 MG/1
TABLET ORAL
Qty: 2 TABLET | Refills: 0 | Status: SHIPPED | OUTPATIENT
Start: 2023-03-16

## 2023-03-16 RX ORDER — CEPHALEXIN 250 MG/1
250 CAPSULE ORAL 4 TIMES DAILY
Qty: 28 CAPSULE | Refills: 0 | Status: SHIPPED | OUTPATIENT
Start: 2023-03-16 | End: 2023-03-23

## 2024-01-30 ENCOUNTER — PATIENT ROUNDING (BHMG ONLY) (OUTPATIENT)
Dept: INTERNAL MEDICINE | Facility: CLINIC | Age: 24
End: 2024-01-30
Payer: COMMERCIAL

## 2024-01-30 ENCOUNTER — OFFICE VISIT (OUTPATIENT)
Dept: INTERNAL MEDICINE | Facility: CLINIC | Age: 24
End: 2024-01-30
Payer: COMMERCIAL

## 2024-01-30 VITALS
SYSTOLIC BLOOD PRESSURE: 135 MMHG | WEIGHT: 144 LBS | BODY MASS INDEX: 24.59 KG/M2 | OXYGEN SATURATION: 97 % | RESPIRATION RATE: 19 BRPM | HEART RATE: 97 BPM | DIASTOLIC BLOOD PRESSURE: 80 MMHG | TEMPERATURE: 98.2 F | HEIGHT: 64 IN

## 2024-01-30 DIAGNOSIS — F33.2 SEVERE EPISODE OF RECURRENT MAJOR DEPRESSIVE DISORDER, WITHOUT PSYCHOTIC FEATURES: Primary | ICD-10-CM

## 2024-01-30 RX ORDER — BUPROPION HYDROCHLORIDE 150 MG/1
150 TABLET ORAL DAILY
Qty: 30 TABLET | Refills: 1 | Status: SHIPPED | OUTPATIENT
Start: 2024-01-30 | End: 2024-01-30 | Stop reason: SDUPTHER

## 2024-01-30 RX ORDER — BUPROPION HYDROCHLORIDE 150 MG/1
150 TABLET ORAL DAILY
Qty: 30 TABLET | Refills: 1 | Status: SHIPPED | OUTPATIENT
Start: 2024-01-30

## 2024-01-30 NOTE — PROGRESS NOTES
Subjective     Chief Complaint   Patient presents with    Mood Concerns       History of Present Illness  Khadijah Houston is a 23-year-old female who presents today with mood concerns.    Ms. Khadijah Houston states she feels like she is on the lower end of the spectrum and was told by her boyfriend that she is bipolar. . She reports she has extreme emotions. She will cry in her car because she thinks about something sad. She had a panic attack that lasted approximately 1 hour the other night. She is unsure if it was depression related. She does have a feeling of emptiness and worthlessness. She also has passive thoughts of suicide. She states she will wake up the next day with a panic attack and feel like nobody cared about her. The next day she will be fine. She notes most of the time she is happy and outgoing. She then adds she flip flops fairly quickly. The patient reports she will sleep every night. She states some nights she will lay in bed and go to bed almost the same time, but some nights she is widely awake. She admits that she does overthinks things consistently. She has taken Zoloft in the past. She does not think that it was beneficial. She notes before she turned 23-year-old, she felt different. She reports once she turned 23-year-old, she started crying in her car. She denies ever taken any other medications for anxiety and depression symptoms. She reports her last blood work was done at [facility] in 2022 when she had a pericarditis. Denies any plan to hurt herself.             PHQ-9 Depression Screening  Little interest or pleasure in doing things? 3-->nearly every day   Feeling down, depressed, or hopeless? 3-->nearly every day   Trouble falling or staying asleep, or sleeping too much? 0-->not at all   Feeling tired or having little energy? 1-->several days   Poor appetite or overeating? 0-->not at all   Feeling bad about yourself - or that you are a failure or have let yourself or your family down?  3-->nearly every day   Trouble concentrating on things, such as reading the newspaper or watching television? 0-->not at all   Moving or speaking so slowly that other people could have noticed? Or the opposite - being so fidgety or restless that you have been moving around a lot more than usual? 0-->not at all   Thoughts that you would be better off dead, or of hurting yourself in some way? 1-->several days   PHQ-9 Total Score 11   If you checked off any problems, how difficult have these problems made it for you to do your work, take care of things at home, or get along with other people?        Over the last two weeks, how often have you been bothered by the following problems?  Feeling nervous, anxious or on edge: More than half the days  Not being able to stop or control worrying: More than half the days  Worrying too much about different things: More than half the days  Trouble Relaxing: Several days  Being so restless that it is hard to sit still: Not at all  Becoming easily annoyed or irritable: Nearly every day  Feeling afraid as if something awful might happen: Several days  HANNAH 7 Total Score: 11  If you checked any problems, how difficult have these problems made it for you to do your work, take care of things at home, or get along with other people: Extremely difficult      Review of Systems   Otherwise complete ROS reviewed and negative except as mentioned in the HPI.    Past Medical History:   Past Medical History:   Diagnosis Date    Pericarditis      Past Surgical History:History reviewed. No pertinent surgical history.  Social History:  reports that she has never smoked. She has never used smokeless tobacco. She reports that she does not currently use alcohol. She reports current drug use. Drug: Marijuana.    Family History: family history includes Heart disease in her maternal grandfather and paternal grandmother; Liver disease in her father; No Known Problems in her brother, mother, and sister.    "    Allergies:  No Known Allergies  Medications:  Prior to Admission medications    Medication Sig Start Date End Date Taking? Authorizing Provider   fluconazole (Diflucan) 150 MG tablet POST ANTIBIOTIC IF DEVELOP VAGINAL ITCHING/BURNING. TAKE AT ONSET OF SYMPTOMS AND REPEAT IN 3 DAYS IF STILL HAVING SYMPTOMS. 3/16/23   Leyda Fajardo APRN       Objective     Vital Signs: /80   Pulse 97   Temp 98.2 °F (36.8 °C)   Resp 19   Ht 162.6 cm (64.02\")   Wt 65.3 kg (144 lb)   SpO2 97%   BMI 24.70 kg/m²   Physical Exam  Vitals reviewed.   Constitutional:       Appearance: Normal appearance. She is well-developed.   HENT:      Head: Normocephalic and atraumatic.   Eyes:      Pupils: Pupils are equal, round, and reactive to light.   Neck:      Vascular: No JVD.   Cardiovascular:      Rate and Rhythm: Normal rate and regular rhythm.   Pulmonary:      Effort: Pulmonary effort is normal.   Abdominal:      General: Bowel sounds are normal.      Palpations: Abdomen is soft.   Musculoskeletal:         General: No deformity.      Cervical back: Normal range of motion and neck supple.   Lymphadenopathy:      Cervical: No cervical adenopathy.   Skin:     General: Skin is warm and dry.   Neurological:      Mental Status: She is alert and oriented to person, place, and time.   Psychiatric:         Behavior: Behavior normal.         Thought Content: Thought content normal.         Judgment: Judgment normal.      Comments: Pt is tearful.        BMI is within normal parameters. No other follow-up for BMI required.    Results Reviewed:  BUN   Date Value Ref Range Status   08/05/2022 11 7 - 21 mg/dL Final     Creatinine   Date Value Ref Range Status   08/05/2022 0.74 0.57 - 1.11 mg/dL Final     Sodium   Date Value Ref Range Status   08/05/2022 143 136 - 145 mmol/L Final     Potassium   Date Value Ref Range Status   08/05/2022 3.9 3.3 - 4.8 mmol/L Final     Comment:     Plasma reference ranges are shown, please note that serum " reference ranges are higher than plasma.     Chloride   Date Value Ref Range Status   08/05/2022 105 98 - 107 mmol/L Final     Total CO2   Date Value Ref Range Status   08/05/2022 26 22 - 29 mmol/L Final     Calcium   Date Value Ref Range Status   08/05/2022 8.9 8.4 - 10.5 mg/dL Final         Assessment / Plan     Assessment/Plan:  Diagnoses and all orders for this visit:    1. Severe episode of recurrent major depressive disorder, without psychotic features (Primary)  -     buPROPion XL (Wellbutrin XL) 150 MG 24 hr tablet; Take 1 tablet by mouth Daily.  Dispense: 30 tablet; Refill: 1  -     CBC & Differential  -     Comprehensive Metabolic Panel  -     TSH  -     GeneSight - Swab,        -     Advised to present to the emergency department if she has suicidal thoughts and to stop the medication    Return in about 3 weeks (around 2/20/2024). unless patient needs to be seen sooner or acute issues arise.    Code Status: Full.     I have discussed the patient results/orders and and plan/recommendation with them at today's visit.      Signed by:    KISHA Raimrez Date: 01/30/24    Transcribed from ambient dictation for KISHA Ramirez by Jn Mccormack.  01/30/24   16:52 CST    Patient or patient representative verbalized consent to the visit recording.  I have personally performed the services described in this document as transcribed by the above individual, and it is both accurate and complete.  KISHA Ramirez  1/30/2024  19:31 CST

## 2024-02-01 ENCOUNTER — TELEPHONE (OUTPATIENT)
Dept: INTERNAL MEDICINE | Facility: CLINIC | Age: 24
End: 2024-02-01
Payer: COMMERCIAL

## 2024-02-01 LAB
ALBUMIN SERPL-MCNC: 4.8 G/DL (ref 4–5)
ALBUMIN/GLOB SERPL: 1.8 {RATIO} (ref 1.2–2.2)
ALP SERPL-CCNC: 81 IU/L (ref 44–121)
ALT SERPL-CCNC: 11 IU/L (ref 0–32)
AST SERPL-CCNC: 17 IU/L (ref 0–40)
BASOPHILS # BLD AUTO: 0 X10E3/UL (ref 0–0.2)
BASOPHILS NFR BLD AUTO: 1 %
BILIRUB SERPL-MCNC: <0.2 MG/DL (ref 0–1.2)
BUN SERPL-MCNC: 12 MG/DL (ref 6–20)
BUN/CREAT SERPL: 15 (ref 9–23)
CALCIUM SERPL-MCNC: 9.5 MG/DL (ref 8.7–10.2)
CHLORIDE SERPL-SCNC: 102 MMOL/L (ref 96–106)
CO2 SERPL-SCNC: 21 MMOL/L (ref 20–29)
CREAT SERPL-MCNC: 0.8 MG/DL (ref 0.57–1)
EGFRCR SERPLBLD CKD-EPI 2021: 105 ML/MIN/1.73
EOSINOPHIL # BLD AUTO: 0.2 X10E3/UL (ref 0–0.4)
EOSINOPHIL NFR BLD AUTO: 3 %
ERYTHROCYTE [DISTWIDTH] IN BLOOD BY AUTOMATED COUNT: 12.3 % (ref 11.7–15.4)
GLOBULIN SER CALC-MCNC: 2.7 G/DL (ref 1.5–4.5)
GLUCOSE SERPL-MCNC: 83 MG/DL (ref 70–99)
HCT VFR BLD AUTO: 42.1 % (ref 34–46.6)
HGB BLD-MCNC: 13.8 G/DL (ref 11.1–15.9)
IMM GRANULOCYTES # BLD AUTO: 0 X10E3/UL (ref 0–0.1)
IMM GRANULOCYTES NFR BLD AUTO: 0 %
LYMPHOCYTES # BLD AUTO: 2 X10E3/UL (ref 0.7–3.1)
LYMPHOCYTES NFR BLD AUTO: 31 %
MCH RBC QN AUTO: 31.7 PG (ref 26.6–33)
MCHC RBC AUTO-ENTMCNC: 32.8 G/DL (ref 31.5–35.7)
MCV RBC AUTO: 97 FL (ref 79–97)
MONOCYTES # BLD AUTO: 0.3 X10E3/UL (ref 0.1–0.9)
MONOCYTES NFR BLD AUTO: 4 %
NEUTROPHILS # BLD AUTO: 4.1 X10E3/UL (ref 1.4–7)
NEUTROPHILS NFR BLD AUTO: 61 %
PLATELET # BLD AUTO: 382 X10E3/UL (ref 150–450)
POTASSIUM SERPL-SCNC: 4.3 MMOL/L (ref 3.5–5.2)
PROT SERPL-MCNC: 7.5 G/DL (ref 6–8.5)
RBC # BLD AUTO: 4.35 X10E6/UL (ref 3.77–5.28)
SODIUM SERPL-SCNC: 140 MMOL/L (ref 134–144)
TSH SERPL DL<=0.005 MIU/L-ACNC: 2.36 UIU/ML (ref 0.45–4.5)
WBC # BLD AUTO: 6.5 X10E3/UL (ref 3.4–10.8)

## 2024-02-01 NOTE — TELEPHONE ENCOUNTER
Caller: Khadijah Houston    Relationship: Self    Best call back number: [phone]     Which medication are you concerned about: buPROPion XL (Wellbutrin XL) 150 MG 24 hr tablet     Who prescribed you this medication: ANDREA LARIOS    When did you start taking this medication:     What are your concerns: MEDICATION WAS SENT TO THE WRONG PHARMACY, PATIENT IS REQUESTING IT TO BE SENT TO [facility]/pharmacy #[id number] - [place], [place] - [address] AT [facility] [facility] [phone] [facility] [phone]

## 2024-02-20 ENCOUNTER — OFFICE VISIT (OUTPATIENT)
Dept: INTERNAL MEDICINE | Facility: CLINIC | Age: 24
End: 2024-02-20
Payer: COMMERCIAL

## 2024-02-20 VITALS
WEIGHT: 143.2 LBS | HEART RATE: 71 BPM | TEMPERATURE: 99.3 F | HEIGHT: 64 IN | BODY MASS INDEX: 24.45 KG/M2 | OXYGEN SATURATION: 96 % | DIASTOLIC BLOOD PRESSURE: 73 MMHG | SYSTOLIC BLOOD PRESSURE: 120 MMHG | RESPIRATION RATE: 18 BRPM

## 2024-02-20 DIAGNOSIS — F33.2 SEVERE EPISODE OF RECURRENT MAJOR DEPRESSIVE DISORDER, WITHOUT PSYCHOTIC FEATURES: ICD-10-CM

## 2024-02-20 DIAGNOSIS — F39 MOOD DISORDER: Primary | ICD-10-CM

## 2024-02-20 PROCEDURE — 99213 OFFICE O/P EST LOW 20 MIN: CPT | Performed by: NURSE PRACTITIONER

## 2024-02-20 RX ORDER — BUPROPION HYDROCHLORIDE 150 MG/1
150 TABLET ORAL DAILY
Qty: 30 TABLET | Refills: 1 | Status: SHIPPED | OUTPATIENT
Start: 2024-02-20

## 2024-02-20 NOTE — PROGRESS NOTES
"        Subjective     Chief Complaint   Patient presents with    Depression     Follow up.        History of Present Illness  Patient comes in today to follow-up on depression.  She had a pretty significantly elevated depression score and was crying frequently.  She was placed on Wellbutrin.  GeneSight was performed unfortunately not able to be retrieved during the office visit today.  She does state that overall she actually feels better.  crying is less.  She does state she is concerned about her hormones.  She wonders if this is contributing to some of her mood problems.      Review of Systems   Otherwise complete ROS reviewed and negative except as mentioned in the HPI.    Past Medical History:   Past Medical History:   Diagnosis Date    Pericarditis      Past Surgical History:History reviewed. No pertinent surgical history.  Social History:  reports that she has never smoked. She has never used smokeless tobacco. She reports that she does not currently use alcohol. She reports current drug use. Drug: Marijuana.    Family History: family history includes Heart disease in her maternal grandfather and paternal grandmother; Liver disease in her father; No Known Problems in her brother, mother, and sister.       Allergies:  No Known Allergies  Medications:  Prior to Admission medications    Medication Sig Start Date End Date Taking? Authorizing Provider   buPROPion XL (Wellbutrin XL) 150 MG 24 hr tablet Take 1 tablet by mouth Daily. 1/30/24  Yes Olivia Miller APRN   fluconazole (Diflucan) 150 MG tablet POST ANTIBIOTIC IF DEVELOP VAGINAL ITCHING/BURNING. TAKE AT ONSET OF SYMPTOMS AND REPEAT IN 3 DAYS IF STILL HAVING SYMPTOMS.  Patient not taking: Reported on 2/20/2024 3/16/23 2/20/24  Leyda Fajardo APRN       Objective     Vital Signs: /73 (BP Location: Right arm, Patient Position: Sitting, Cuff Size: Adult)   Pulse 71   Temp 99.3 °F (37.4 °C) (Skin)   Resp 18   Ht 162.6 cm (64.02\")   Wt 65 " kg (143 lb 3.2 oz)   SpO2 96%   BMI 24.56 kg/m²   Physical Exam  Vitals reviewed.   Constitutional:       Appearance: She is well-developed.   HENT:      Head: Normocephalic and atraumatic.   Eyes:      Pupils: Pupils are equal, round, and reactive to light.   Neck:      Vascular: No JVD.   Cardiovascular:      Rate and Rhythm: Normal rate and regular rhythm.   Pulmonary:      Effort: Pulmonary effort is normal.   Abdominal:      General: Bowel sounds are normal.      Palpations: Abdomen is soft.   Musculoskeletal:         General: No deformity.      Cervical back: Normal range of motion and neck supple.   Lymphadenopathy:      Cervical: No cervical adenopathy.   Skin:     General: Skin is warm and dry.   Neurological:      Mental Status: She is alert and oriented to person, place, and time.   Psychiatric:         Behavior: Behavior normal.         Thought Content: Thought content normal.         Judgment: Judgment normal.      Comments: Less tearful       BMI is within normal parameters. No other follow-up for BMI required.    Results Reviewed:  Glucose   Date Value Ref Range Status   01/30/2024 83 70 - 99 mg/dL Final     BUN   Date Value Ref Range Status   01/30/2024 12 6 - 20 mg/dL Final   08/05/2022 11 7 - 21 mg/dL Final     Creatinine   Date Value Ref Range Status   01/30/2024 0.80 0.57 - 1.00 mg/dL Final   08/05/2022 0.74 0.57 - 1.11 mg/dL Final     Sodium   Date Value Ref Range Status   01/30/2024 140 134 - 144 mmol/L Final   08/05/2022 143 136 - 145 mmol/L Final     Potassium   Date Value Ref Range Status   01/30/2024 4.3 3.5 - 5.2 mmol/L Final   08/05/2022 3.9 3.3 - 4.8 mmol/L Final     Comment:     Plasma reference ranges are shown, please note that serum reference ranges are higher than plasma.     Chloride   Date Value Ref Range Status   01/30/2024 102 96 - 106 mmol/L Final   08/05/2022 105 98 - 107 mmol/L Final     Total CO2   Date Value Ref Range Status   01/30/2024 21 20 - 29 mmol/L Final    08/05/2022 26 22 - 29 mmol/L Final     Calcium   Date Value Ref Range Status   01/30/2024 9.5 8.7 - 10.2 mg/dL Final   08/05/2022 8.9 8.4 - 10.5 mg/dL Final     ALT (SGPT)   Date Value Ref Range Status   01/30/2024 11 0 - 32 IU/L Final     AST (SGOT)   Date Value Ref Range Status   01/30/2024 17 0 - 40 IU/L Final     WBC   Date Value Ref Range Status   01/30/2024 6.5 3.4 - 10.8 x10E3/uL Final     Hematocrit   Date Value Ref Range Status   01/30/2024 42.1 34.0 - 46.6 % Final     Platelets   Date Value Ref Range Status   01/30/2024 382 150 - 450 x10E3/uL Final         Assessment / Plan     Assessment/Plan:  Diagnoses and all orders for this visit:    1. Mood disorder (Primary)  -     DHEA-sulfate  -     Follicle stimulating hormone  -     Luteinizing hormone  -     Prolactin  -     Testosterone  -     Estrogens, Total  -     Progesterone    2. Severe episode of recurrent major depressive disorder, without psychotic features  -     buPROPion XL (Wellbutrin XL) 150 MG 24 hr tablet; Take 1 tablet by mouth Daily.  Dispense: 30 tablet; Refill: 1      Return for Annual physical. unless patient needs to be seen sooner or acute issues arise.    Code Status: full    I have discussed the patient results/orders and and plan/recommendation with them at today's visit.      Signed by:    KISHA Ramirez Date: 02/22/24

## 2024-02-22 DIAGNOSIS — F33.2 SEVERE EPISODE OF RECURRENT MAJOR DEPRESSIVE DISORDER, WITHOUT PSYCHOTIC FEATURES: ICD-10-CM

## 2024-02-22 DIAGNOSIS — F33.2 SEVERE EPISODE OF RECURRENT MAJOR DEPRESSIVE DISORDER, WITHOUT PSYCHOTIC FEATURES: Primary | ICD-10-CM

## 2024-02-27 LAB
DHEA-S SERPL-MCNC: 508 UG/DL (ref 84.8–378)
ESTROGEN SERPL-MCNC: 184 PG/ML
FSH SERPL-ACNC: 2.7 MIU/ML
LH SERPL-ACNC: 7.5 MIU/ML
PROGEST SERPL-MCNC: 11.2 NG/ML
PROLACTIN SERPL-MCNC: 44.3 NG/ML (ref 4.8–33.4)
TESTOST SERPL-MCNC: 25 NG/DL (ref 13–71)

## 2024-02-28 DIAGNOSIS — R79.89 ELEVATED PROLACTIN LEVEL: ICD-10-CM

## 2024-02-28 DIAGNOSIS — R79.89 ELEVATED DHEA: Primary | ICD-10-CM

## 2024-04-09 ENCOUNTER — TELEPHONE (OUTPATIENT)
Dept: INTERNAL MEDICINE | Facility: CLINIC | Age: 24
End: 2024-04-09
Payer: COMMERCIAL